# Patient Record
Sex: MALE | Race: BLACK OR AFRICAN AMERICAN | NOT HISPANIC OR LATINO | URBAN - METROPOLITAN AREA
[De-identification: names, ages, dates, MRNs, and addresses within clinical notes are randomized per-mention and may not be internally consistent; named-entity substitution may affect disease eponyms.]

---

## 2019-06-13 ENCOUNTER — EMERGENCY (EMERGENCY)
Facility: HOSPITAL | Age: 44
LOS: 1 days | Discharge: HOME | End: 2019-06-13
Admitting: EMERGENCY MEDICINE
Payer: MEDICAID

## 2019-06-13 VITALS
OXYGEN SATURATION: 98 % | RESPIRATION RATE: 18 BRPM | TEMPERATURE: 99 F | WEIGHT: 115.08 LBS | DIASTOLIC BLOOD PRESSURE: 77 MMHG | HEART RATE: 92 BPM | SYSTOLIC BLOOD PRESSURE: 127 MMHG

## 2019-06-13 DIAGNOSIS — H66.92 OTITIS MEDIA, UNSPECIFIED, LEFT EAR: ICD-10-CM

## 2019-06-13 DIAGNOSIS — H92.02 OTALGIA, LEFT EAR: ICD-10-CM

## 2019-06-13 PROCEDURE — 99283 EMERGENCY DEPT VISIT LOW MDM: CPT

## 2019-06-13 RX ORDER — AMOXICILLIN 250 MG/5ML
1 SUSPENSION, RECONSTITUTED, ORAL (ML) ORAL
Qty: 14 | Refills: 0
Start: 2019-06-13 | End: 2019-06-19

## 2019-06-13 RX ORDER — IBUPROFEN 200 MG
600 TABLET ORAL ONCE
Refills: 0 | Status: COMPLETED | OUTPATIENT
Start: 2019-06-13 | End: 2019-06-13

## 2019-06-13 RX ADMIN — Medication 600 MILLIGRAM(S): at 17:46

## 2019-06-13 RX ADMIN — Medication 1 TABLET(S): at 17:46

## 2019-06-13 NOTE — ED PROVIDER NOTE - NSFOLLOWUPCLINICS_GEN_ALL_ED_FT
[facility]  Otolaryngology (ENT)  [address], [address]  [place] [address]  Phone: [phone]  Fax:   Follow Up Time: 1-3 Days

## 2019-06-13 NOTE — ED PROVIDER NOTE - OBJECTIVE STATEMENT
45 y/o M with no known significant PMH presents to the ED c/o a left earache x 1 week. The earache is dull and moderate in nature with no aggravating or alleviating factors. He has not taken any medication for the pain. There are no other associated sx's.    Denies fever, chills, headache, dizziness, tinnitus, hearing loss, nasal congestion or rhinorrhea, sore throat or cough

## 2019-06-13 NOTE — ED PROVIDER NOTE - NSFOLLOWUPINSTRUCTIONS_ED_ALL_ED_FT
TAKE 1 TAB AMOXICILLIN 875MG BY MOUTH TWICE A DAY FOR 7 DAYS.    FOLLOW UP WITH YOUR PRIMARY CARE PHYSICIAN AND THE ENT CLINIC THIS WEEK.    RETURN TO THE ER IMMEDIATELY IF YOU DEVELOP A FEVER, SHAKING CHILLS, HEADACHE, DIZZINESS, RINGING IN YOUR EAR, HEARING LOSS, BLOOD OR DISCHARGE COMING FROM YOUR EAR, OR ANY OTHER CONCERNING SIGNS OR SYMPTOMS.

## 2019-06-13 NOTE — ED PROVIDER NOTE - CLINICAL SUMMARY MEDICAL DECISION MAKING FREE TEXT BOX
Clinical presentation c/w otitis media. Rx amoxicillin, supportive tx instructions, F/U with Primary Care and ENT this week. Strict return precautions reviewed with pt in which pt verbalizes understanding and agrees to.